# Patient Record
Sex: MALE | Race: WHITE | NOT HISPANIC OR LATINO | Employment: UNEMPLOYED | URBAN - METROPOLITAN AREA
[De-identification: names, ages, dates, MRNs, and addresses within clinical notes are randomized per-mention and may not be internally consistent; named-entity substitution may affect disease eponyms.]

---

## 2020-01-08 ENCOUNTER — HOSPITAL ENCOUNTER (OUTPATIENT)
Dept: PHYSICAL THERAPY | Facility: CLINIC | Age: 55
Setting detail: THERAPIES SERIES
End: 2020-01-08
Attending: FAMILY MEDICINE
Payer: MEDICARE

## 2020-01-08 PROCEDURE — 97161 PT EVAL LOW COMPLEX 20 MIN: CPT | Mod: GP | Performed by: PHYSICAL THERAPIST

## 2020-01-08 PROCEDURE — 97110 THERAPEUTIC EXERCISES: CPT | Mod: GP | Performed by: PHYSICAL THERAPIST

## 2020-01-10 NOTE — PROGRESS NOTES
[facility]        OUTPATIENT PHYSICAL THERAPY FUNCTIONAL EVALUATION  PLAN OF TREATMENT FOR OUTPATIENT REHABILITATION  (COMPLETE FOR INITIAL CLAIMS ONLY)  Patient's Last Name, First Name, M.I.  YOB: 1965  Rikki Xiao        Provider's Name   [facility]   Medical Record No.  [id number]     Start of Care Date:  01/08/20   Onset Date:  01/08/15   Type:     _X__PT   ____OT  ____SLP Medical Diagnosis:  CVA     PT Diagnosis:  Imparied gait, impaired balance, left sided weakness and hypertonicity Visits from SOC:  1                              __________________________________________________________________________________  Plan of Treatment/Functional Goals:  balance training, gait training, neuromuscular re-education, ROM, strengthening, stretching, transfer training           GOALS  Community gait  Herb will be able to walk for 2 blocks with AD with tolerable fatigue for community distances.  04/07/20    Floor transfer  Herb will be able to demonstrate a safe floor transfer with UE support in 2/2 trials for increased safety in the event of a fall.  04/07/20    Bed transfer  Herb will demonstrate and report increased ease and safety with getting out of his bed in 2/2 trials with use of equipment as needed for safey.  04/07/20                                                           Therapy Frequency:  1 time/week   Predicted Duration of Therapy Intervention:  90 days    China Shoemaker, PT                                    I CERTIFY THE NEED FOR THESE SERVICES FURNISHED UNDER        THIS PLAN OF TREATMENT AND WHILE UNDER MY CARE .             Physician Signature               Date    X_____________________________________________________                      Certification Date From:  01/08/20   Certification Date To:  04/07/20    Referring Provider:  Devon Rosas,  MD    Initial Assessment  See Epic Evaluation- Start of Care Date: 01/08/20

## 2020-01-10 NOTE — PROGRESS NOTES
"   01/08/20 1200   Quick Adds   Quick Adds Certification   Type of Visit Initial OP PT Evaluation   General Information   Start of Care Date 01/08/20   Referring Physician Devon Rosas MD   Orders Evaluate and Treat as Indicated   Order Date 10/24/19   Medical Diagnosis CVA   Onset of illness/injury or Date of Surgery 01/08/15   Surgical/Medical history reviewed Yes   Pertinent history of current problem (include personal factors and/or comorbidities that impact the POC) CVA with left sided weakness in 2015.  He has done PT in the past and is currently walking with quad cane.   Patient role/Employment history Disabled   Living environment House[facility]   Home/Community Accessibility Comments full flight with rail, lives with mom, Suri, does not drive   Current Assistive Devices Quad Cane   Patient/Family Goals Statement Getting out of bed easier, walking distance, postural exercises, floor transfer   General Information Comments \"Herb\"   Fall Risk Screen   Fall screen completed by PT   Have you fallen 2 or more times in the past year? No   Have you fallen and had an injury in the past year? No   Timed Up and Go score (seconds) 22.35   Is patient a fall risk? Yes   Fall screen comments 17.75 seconds with use of quad cane   Pain   Patient currently in pain Yes   Pain location Abdominal pain, LBP   Pain rating 5/10   Vitals Signs   Heart Rate 77   SpO2 92   Blood Pressure 113/71   Cognitive Status Examination   Orientation orientation to person, place and time   Level of Consciousness alert   Follows Commands and Answers Questions 100% of the time;able to follow multistep instructions   Personal Safety and Judgment intact   Posture   Posture Comments Forward posture with left hemiparesis with left UE in flexed pattern with closed hand   Range of Motion (ROM)   ROM Comment Left sided hemiparesis   Strength   Strength Comments Left hip flexion=4/5, left hip adduction=4/5, left ankle dorsiflexion= 3/5   Bed Mobility "   Bed Mobility Comments Difficulty getting out of bed is reported   Transfer Skills   Transfer Comments Relies on the back of chair for leg support.   Gait Special Tests   Gait Special Tests 25 FOOT TIMED WALK   Gait Special Tests 25 Foot Timed Walk   Seconds 14.25   Steps 22 Steps   Balance   Balance other (describe)   Balance Special Tests   Balance Special Tests Sit to stand reps   Balance Special Tests Sit to Stand Reps in 30 Seconds   Reps in 30 seconds 9   Height 18   Sensory Examination   Sensory Perception no deficits were identified   Muscle Tone   Muscle Tone other (describe)   Muscle Tone Comments Previously on baclofen for tone on left side, but reports he is not longer taking it.   Planned Therapy Interventions   Planned Therapy Interventions balance training;gait training;neuromuscular re-education;ROM;strengthening;stretching;transfer training   Clinical Impression   Criteria for Skilled Therapeutic Interventions Met yes, treatment indicated   PT Diagnosis Imparied gait, impaired balance, left sided weakness and hypertonicity   Influenced by the following impairments Left sided weakness, decreased insight for safety, impaired gait and balance, hyprtonicity, fatigue   Functional limitations due to impairments At risk for falls, impaired gait with limited functional distance hortencia to fatigue   Clinical Presentation Stable/Uncomplicated   Clinical Presentation Rationale Clinical judgement, patient report and functional movement   Clinical Decision Making (Complexity) Low complexity   Therapy Frequency 1 time/week   Predicted Duration of Therapy Intervention (days/wks) 90 days   Risk & Benefits of therapy have been explained Yes   Patient, Family & other staff in agreement with plan of care Yes   Clinical Impression Comments Herb presents with residual left sided sypertonicity and weakness from a CVA in 2015 now with increased challenges with balance and gait and is at increased risk for falls with  decreased insight into safety.  He will benefit from skilled PT to address mobility impairments.    GOALS   PT Eval Goals 1;2;3   Goal 1   Goal Identifier Community gait   Goal Description Herb will be able to walk for 2 blocks with AD with tolerable fatigue for community distances.   Target Date 04/07/20   Goal 2   Goal Identifier Floor transfer   Goal Description Herb will be able to demonstrate a safe floor transfer with UE support in 2/2 trials for increased safety in the event of a fall.   Target Date 04/07/20   Goal 3   Goal Identifier Bed transfer   Goal Description Herb will demonstrate and report increased ease and safety with getting out of his bed in 2/2 trials with use of equipment as needed for safey.   Target Date 04/07/20   Total Evaluation Time   PT Mendel, Low Complexity Minutes (40589) 30   Therapy Certification   Certification date from 01/08/20   Certification date to 04/07/20   Medical Diagnosis CVA   Certification I certify the need for these services furnished under this plan of treatment and while under my care.  (Physician co-signature of this document indicates review and certification of the therapy plan).

## 2020-01-22 ENCOUNTER — HOSPITAL ENCOUNTER (OUTPATIENT)
Dept: PHYSICAL THERAPY | Facility: CLINIC | Age: 55
Setting detail: THERAPIES SERIES
End: 2020-01-22
Attending: FAMILY MEDICINE
Payer: MEDICARE

## 2020-01-22 PROCEDURE — 97112 NEUROMUSCULAR REEDUCATION: CPT | Mod: GP | Performed by: PHYSICAL THERAPIST

## 2020-01-22 PROCEDURE — 97116 GAIT TRAINING THERAPY: CPT | Mod: GP | Performed by: PHYSICAL THERAPIST

## 2020-02-12 ENCOUNTER — HOSPITAL ENCOUNTER (OUTPATIENT)
Dept: PHYSICAL THERAPY | Facility: CLINIC | Age: 55
Setting detail: THERAPIES SERIES
End: 2020-02-12
Attending: FAMILY MEDICINE
Payer: MEDICARE

## 2020-02-12 PROCEDURE — 97116 GAIT TRAINING THERAPY: CPT | Mod: GP | Performed by: PHYSICAL THERAPIST

## 2020-02-12 PROCEDURE — 97110 THERAPEUTIC EXERCISES: CPT | Mod: GP | Performed by: PHYSICAL THERAPIST

## 2020-02-19 ENCOUNTER — HOSPITAL ENCOUNTER (OUTPATIENT)
Dept: PHYSICAL THERAPY | Facility: CLINIC | Age: 55
Setting detail: THERAPIES SERIES
End: 2020-02-19
Attending: FAMILY MEDICINE
Payer: MEDICARE

## 2020-02-19 PROCEDURE — 97112 NEUROMUSCULAR REEDUCATION: CPT | Mod: GP | Performed by: PHYSICAL THERAPIST

## 2020-02-19 PROCEDURE — 97110 THERAPEUTIC EXERCISES: CPT | Mod: GP | Performed by: PHYSICAL THERAPIST

## 2020-02-19 PROCEDURE — 97116 GAIT TRAINING THERAPY: CPT | Mod: GP | Performed by: PHYSICAL THERAPIST

## 2020-02-26 ENCOUNTER — HOSPITAL ENCOUNTER (OUTPATIENT)
Dept: PHYSICAL THERAPY | Facility: CLINIC | Age: 55
Setting detail: THERAPIES SERIES
End: 2020-02-26
Attending: FAMILY MEDICINE
Payer: MEDICARE

## 2020-02-26 PROCEDURE — 97116 GAIT TRAINING THERAPY: CPT | Mod: GP | Performed by: PHYSICAL THERAPIST

## 2020-02-26 PROCEDURE — 97110 THERAPEUTIC EXERCISES: CPT | Mod: GP | Performed by: PHYSICAL THERAPIST

## 2020-02-26 PROCEDURE — 97112 NEUROMUSCULAR REEDUCATION: CPT | Mod: GP | Performed by: PHYSICAL THERAPIST

## 2020-03-03 ENCOUNTER — THERAPY VISIT (OUTPATIENT)
Dept: OCCUPATIONAL THERAPY | Facility: CLINIC | Age: 55
End: 2020-03-03
Payer: MEDICARE

## 2020-03-03 DIAGNOSIS — M24.542 CONTRACTURE OF JOINT OF HAND, LEFT: ICD-10-CM

## 2020-03-03 PROCEDURE — 97110 THERAPEUTIC EXERCISES: CPT | Mod: GO | Performed by: OCCUPATIONAL THERAPIST

## 2020-03-03 PROCEDURE — 97760 ORTHOTIC MGMT&TRAING 1ST ENC: CPT | Mod: GO | Performed by: OCCUPATIONAL THERAPIST

## 2020-03-03 PROCEDURE — 97165 OT EVAL LOW COMPLEX 30 MIN: CPT | Mod: GO | Performed by: OCCUPATIONAL THERAPIST

## 2020-03-03 NOTE — PROGRESS NOTES
Hand Therapy Initial Evaluation    Current Date:  3/3/2020  Referring Physician: Dr. Rosas    Diagnosis: Left hand contracture  DOI: 1/29/20 (Date of order)    Subjective:  Rikki Xiao is a 54 year old right hand dominant male.    Patient reports symptoms of pain, stiffness/loss of motion and weakness/loss of strength of the left hand which occurred due to CVA 5 years ago. Since onset symptoms are Gradually getting worse.  Special tests:  none.  Previous treatment: OT and hand splint.    General health as reported by patient is good.  Pertinent medical history includes:High Blood Pressure, Overweight  Medical allergies:none.  Surgical history: other: diverticulitis.  Medication history: High Blood Pressure, blood thinner, cholesterol.    Occupational Profile Information:  Current occupation is retired  Prior functional level:  independent-shared household chores  independent-have help in some areas  Barriers include:transportation, requires assistance with dressing, personal hygiene, transfers, mobility  Mobility: Ambulates with aid of cane  Transportation: public transportation, medical transportation and relies on family and friends for transportation  Leisure activities/hobbies: watch TV    Upper Extremity Functional Index Score:  SCORE:   Column Totals: /80: 52   (A lower score indicates greater disability.)    Objective:  Pain Level (Scale 0-10)   3/3/2020   At Rest 1/10   With Use 1/10     Pain Description  Date 3/3/2020   Location wrist and hand   Pain Quality Aching, Sharp and Throbbing   Frequency intermittent     Pain is worst  daytime   Exacerbated by  stretching fingers and wrist in extension   Relieved by rest   Progression unchanged     Edema  None    Sensation   WNL throughout all nerve distributions; per patient report    ROM  Hand 3/3/2020   PROM Left   Index MP /   PIP /   DIP -55/   CHAVEZ    Long MP /   PIP -80/   DIP -25/   CHAVEZ    Ring MP /   PIP -85/   DIP 0/   CHAVEZ    Small MP /   PIP -60/    DIP -10/   CHAVEZ      Wrist: -50 degrees extension passively    No active movement of left digits 2-5.  Patient able to move thumb slightly but not functionally    Strength: contraindicated    Palpation  Pain Report:  - none    + mild    ++ moderate    +++ severe     3/3/20      Wrist and digits  ++                                            Observation: Patient postures (L) UE in a flexor synergy pattern.  Moderate to severe tone noted.  Long fingernails with the potential to dig into palm.  Fingers flexed tight into palm with an odor in palm     Assessment:  Patient presents with symptoms consistent with diagnosis of left wrist and hand flexion contractures,  with conservative intervention.     Patient's limitations or Problem List includes:  Pain, Decreased ROM/motion, Weakness, Tightness in musculature and Adherence in connective tissue of the left elbow, wrist and hand which interferes with the patient's ability to perform Self Care Tasks (dressing, bathing, hygiene/toileting) and Household Chores as compared to previous level of function.    Rehab Potential:  Poor - Return to restricted activity    Patient will benefit from skilled Occupational Therapy to increase ROM and flexibility and decrease pain and risk of further contracture deformity to return to previous activity level and resume normal daily tasks and to reach their rehab potential.    Barriers to Learning:  No barrier    Communication Issues:  Patient appears to be able to clearly communicate and understand verbal and written communication and follow directions correctly.    Chart Review: Brief history including review of medical and/or therapy records relating to the presenting problem and Simple history review with patient    Identified Performance Deficits: bathing/showering, dressing and home establishment and management    Assessment of Occupational Performance:  1-3 Performance Deficits    Clinical Decision Making (Complexity): Low  complexity    Treatment Explanation:  The following has been discussed with the patient:  RX ordered/plan of care  Anticipated outcomes  Possible risks and side effects    Plan:  Frequency:  1 X week, once daily  Duration:  for 4 weeks    Treatment Plan:   Therapeutic Exercise:  PROM  Orthotic Fabrication:  Static orthosis and Forearm based orthosis    Discharge Plan:  Achieve all LTG.  Independent in home treatment program.  Reach maximal therapeutic benefit.    Home Exercise Program:  Compliance with wear of custom orthosis to decrease contractures.  Begin by wearing orthosis 1-2 hours on and 2 hours off per day.  Progress to night time wear.  Patient's roomate educated on orthosis application and PROM of fingers and wrist  Check daily for skin breakdown.    Next Visit: May benefit for OT in [facility] for upper extremity rehab.  No further visits scheduled at this time. The chart will be left open for one month to allow patient to schedule further appointments if problems develop. If no additional therapy sessions are scheduled, then the patient will be discharged and this will serve as a discharge note.

## 2020-03-03 NOTE — LETTER
DEPARTMENT OF HEALTH AND HUMAN SERVICES  [facility]    PLAN/UPDATED PLAN OF PROGRESS FOR OUTPATIENT REHABILITATION    PATIENTS NAME:  Rikki Xiao     : 1965    PROVIDER NUMBER:  [id number]    [id number]     PROVIDER NAME: [facility] [place]    MEDICAL RECORD NUMBER: [id number]     START OF CARE DATE:    SOC Date: 20     TYPE:  OT    PRIMARY/TREATMENT DIAGNOSIS: (Pertinent Medical Diagnosis)  Contracture of joint of hand, left    VISITS FROM START OF CARE:  Rxs Used: 1     Hand Therapy Initial Evaluation  Current Date:  3/3/2020  Referring Physician: Dr. Rosas  Diagnosis: Left hand contracture  DOI: 20 (Date of order)    Subjective:  Rikki Xiao is a 54 year old right hand dominant male.    Patient reports symptoms of pain, stiffness/loss of motion and weakness/loss of strength of the left hand which occurred due to CVA 5 years ago. Since onset symptoms are Gradually getting worse.  Special tests:  none.  Previous treatment: OT and hand splint.      General health as reported by patient is good.      Pertinent medical history includes:High Blood Pressure, Overweight  Medical allergies:none.  Surgical history: other: diverticulitis.  Medication history: High Blood Pressure, blood thinner, cholesterol.    Occupational Profile Information:  Current occupation is retired  Prior functional level:  independent-shared household chores  independent-have help in some areas  Barriers include:transportation, requires assistance with dressing, personal hygiene, transfers, mobility  Mobility: Ambulates with aid of cane  Transportation: public transportation, medical transportation and relies on family and friends for transportation  Leisure activities/hobbies: watch TV    Upper Extremity Functional Index Score:  SCORE:   Column Totals: /80: 52   (A lower score indicates greater disability.)    Objective:  Pain Level (Scale 0-10)   3/3/2020   At Rest 1/10    With Use 1/10     Pain Description  Date 3/3/2020   Location wrist and hand   Pain Quality Aching, Sharp and Throbbing   Frequency intermittent     Pain is worst  daytime   Exacerbated by  stretching fingers and wrist in extension   Relieved by rest   Progression unchanged     Edema  None    Sensation   WNL throughout all nerve distributions; per patient report    ROM  Hand 3/3/2020   PROM Left   Index MP /   PIP /   DIP -55/   CHAVEZ    Long MP /   PIP -80/   DIP -25/   CHAVEZ    Ring MP /   PIP -85/   DIP 0/   CHAVEZ    Small MP /   PIP -60/   DIP -10/   CHAVEZ      Wrist: -50 degrees extension passively  No active movement of left digits 2-5.  Patient able to move thumb slightly but not functionally  Strength: contraindicated    Palpation  Pain Report:  - none    + mild    ++ moderate    +++ severe     3/3/20      Wrist and digits  ++         Observation: Patient postures (L) UE in a flexor synergy pattern.  Moderate to severe tone noted.  Long fingernails with the potential to dig into palm.  Fingers flexed tight into palm with an odor in palm     Assessment:  Patient presents with symptoms consistent with diagnosis of left wrist and hand flexion contractures,  with conservative intervention.     Patient's limitations or Problem List includes:  Pain, Decreased ROM/motion, Weakness, Tightness in musculature and Adherence in connective tissue of the left elbow, wrist and hand which interferes with the patient's ability to perform Self Care Tasks (dressing, bathing, hygiene/toileting) and Household Chores as compared to previous level of function.    Rehab Potential:  Poor - Return to restricted activity    Patient will benefit from skilled Occupational Therapy to increase ROM and flexibility and decrease pain and risk of further contracture deformity to return to previous activity level and resume normal daily tasks and to reach their rehab potential.    Barriers to Learning:  No barrier    Communication Issues:  Patient  appears to be able to clearly communicate and understand verbal and written communication and follow directions correctly.    Chart Review: Brief history including review of medical and/or therapy records relating to the presenting problem and Simple history review with patient    Identified Performance Deficits: bathing/showering, dressing and home establishment and management    Assessment of Occupational Performance:  1-3 Performance Deficits    Clinical Decision Making (Complexity): Low complexity    Treatment Explanation:  The following has been discussed with the patient:  RX ordered/plan of care  Anticipated outcomes  Possible risks and side effects    Plan:  Frequency:  1 X week, once daily  Duration:  for 4 weeks    Treatment Plan:   Therapeutic Exercise:  PROM  Orthotic Fabrication:  Static orthosis and Forearm based orthosis      Discharge Plan:  Achieve all LTG.  Independent in home treatment program.  Reach maximal therapeutic benefit.    Home Exercise Program:  Compliance with wear of custom orthosis to decrease contractures.  Begin by wearing orthosis 1-2 hours on and 2 hours off per day.  Progress to night time wear.  Patient's roomate educated on orthosis application and PROM of fingers and wrist  Check daily for skin breakdown.    Next Visit: May benefit for OT in [facility] for upper extremity rehab.  No further visits scheduled at this time. The chart will be left open for one month to allow patient to schedule further appointments if problems develop. If no additional therapy sessions are scheduled, then the patient will be discharged and this will serve as a discharge note.    Caregiver Signature/Credentials ______________________________ Date ________       Treating Provider: Francheska GREWAL/L, CHT      I have reviewed and certified the need for these services and plan of treatment while under my care.      PHYSICIAN'S SIGNATURE:   _____________________________________   "Date___________                           Devon Rosas MD    Certification period: Beginning of Cert date period: 03/03/20 End of Cert period date: 05/31/20     Functional Level Progress Report: Please see attached \"Goal Flow sheet for Functional level.\"    ___X_____ Continue Services or       ________ DC Services                Service dates: SOC Date: 03/03/20  to present                                                                     "

## 2020-03-04 ENCOUNTER — HOSPITAL ENCOUNTER (OUTPATIENT)
Dept: PHYSICAL THERAPY | Facility: CLINIC | Age: 55
Setting detail: THERAPIES SERIES
End: 2020-03-04
Attending: FAMILY MEDICINE
Payer: MEDICARE

## 2020-03-04 PROCEDURE — 97110 THERAPEUTIC EXERCISES: CPT | Mod: GP | Performed by: PHYSICAL THERAPIST

## 2020-03-04 PROCEDURE — 97116 GAIT TRAINING THERAPY: CPT | Mod: GP | Performed by: PHYSICAL THERAPIST

## 2020-04-17 PROBLEM — M24.542: Status: RESOLVED | Noted: 2020-03-03 | Resolved: 2020-04-17
